# Patient Record
Sex: MALE | Race: OTHER | RURAL
[De-identification: names, ages, dates, MRNs, and addresses within clinical notes are randomized per-mention and may not be internally consistent; named-entity substitution may affect disease eponyms.]

---

## 2020-07-02 NOTE — PATIENT DISCUSSION
GLAUCOMA:  I have talked with the patient about my impressions, explained our treatment plan, and have answered all questions and patient understands. Continue present eye drops.  Patient to follow up 6 months DIL/OCT

## 2023-04-20 ENCOUNTER — NEW PATIENT (OUTPATIENT)
Dept: RURAL CLINIC 3 | Facility: CLINIC | Age: 63
End: 2023-04-20

## 2023-04-20 DIAGNOSIS — H52.223: ICD-10-CM

## 2023-04-20 DIAGNOSIS — H52.03: ICD-10-CM

## 2023-04-20 DIAGNOSIS — H52.4: ICD-10-CM

## 2023-04-20 PROCEDURE — S0620 ROUTINE OPHTHALMOLOGICAL EXA: HCPCS

## 2023-04-20 ASSESSMENT — VISUAL ACUITY
OD_CC: 20/25-1
OS_CC: 20/20

## 2023-04-20 ASSESSMENT — TONOMETRY
OS_IOP_MMHG: 13
OD_IOP_MMHG: 12

## 2024-04-24 ENCOUNTER — ESTABLISHED PATIENT (OUTPATIENT)
Dept: RURAL CLINIC 3 | Facility: CLINIC | Age: 64
End: 2024-04-24

## 2024-04-24 DIAGNOSIS — E11.9: ICD-10-CM

## 2024-04-24 DIAGNOSIS — H21.541: ICD-10-CM

## 2024-04-24 DIAGNOSIS — H25.13: ICD-10-CM

## 2024-04-24 PROCEDURE — 92250 FUNDUS PHOTOGRAPHY W/I&R: CPT

## 2024-04-24 PROCEDURE — 99214 OFFICE O/P EST MOD 30 MIN: CPT

## 2024-04-24 ASSESSMENT — VISUAL ACUITY
OU_CC: 20/20-1
OD_CC: 20/20-1
OS_CC: 20/20-2

## 2024-04-24 ASSESSMENT — TONOMETRY
OD_IOP_MMHG: 13
OS_IOP_MMHG: 13

## 2024-12-10 ENCOUNTER — EMERGENCY VISIT (OUTPATIENT)
Age: 64
End: 2024-12-10

## 2024-12-10 DIAGNOSIS — H25.13: ICD-10-CM

## 2024-12-10 DIAGNOSIS — E11.9: ICD-10-CM

## 2024-12-10 PROCEDURE — 92250 FUNDUS PHOTOGRAPHY W/I&R: CPT

## 2024-12-10 PROCEDURE — 99214 OFFICE O/P EST MOD 30 MIN: CPT

## 2025-02-03 ENCOUNTER — CONSULTATION/EVALUATION (OUTPATIENT)
Age: 65
End: 2025-02-03

## 2025-02-03 DIAGNOSIS — E11.9: ICD-10-CM

## 2025-02-03 DIAGNOSIS — H25.813: ICD-10-CM

## 2025-02-03 PROCEDURE — 99214 OFFICE O/P EST MOD 30 MIN: CPT

## 2025-02-03 PROCEDURE — 92136 OPHTHALMIC BIOMETRY: CPT

## 2025-02-03 PROCEDURE — 92025 CPTRIZED CORNEAL TOPOGRAPHY: CPT | Mod: NC

## 2025-02-03 PROCEDURE — 92134 CPTRZ OPH DX IMG PST SGM RTA: CPT | Mod: NC

## 2025-02-21 ENCOUNTER — PRE-OP/H&P (OUTPATIENT)
Age: 65
End: 2025-02-21

## 2025-02-21 VITALS
DIASTOLIC BLOOD PRESSURE: 78 MMHG | HEART RATE: 90 BPM | BODY MASS INDEX: 26.47 KG/M2 | HEIGHT: 70.5 IN | WEIGHT: 187 LBS | SYSTOLIC BLOOD PRESSURE: 152 MMHG

## 2025-02-21 DIAGNOSIS — E11.9: ICD-10-CM

## 2025-02-21 DIAGNOSIS — K21.9: ICD-10-CM

## 2025-02-21 DIAGNOSIS — E78.2: ICD-10-CM

## 2025-02-21 DIAGNOSIS — I10: ICD-10-CM

## 2025-02-21 DIAGNOSIS — N40: ICD-10-CM

## 2025-02-21 DIAGNOSIS — Z01.818: ICD-10-CM

## 2025-02-21 PROCEDURE — 99213 OFFICE O/P EST LOW 20 MIN: CPT

## 2025-03-21 ENCOUNTER — POST OP/EVAL OF SECOND EYE (OUTPATIENT)
Age: 65
End: 2025-03-21

## 2025-03-21 ENCOUNTER — SURGERY/PROCEDURE (OUTPATIENT)
Age: 65
End: 2025-03-21

## 2025-03-21 DIAGNOSIS — H25.812: ICD-10-CM

## 2025-03-21 DIAGNOSIS — Z98.42: ICD-10-CM

## 2025-03-21 PROCEDURE — 66984 XCAPSL CTRC RMVL W/O ECP: CPT

## 2025-03-21 PROCEDURE — 99024 POSTOP FOLLOW-UP VISIT: CPT

## 2025-05-21 ENCOUNTER — PRE-OP/H&P (OUTPATIENT)
Age: 65
End: 2025-05-21

## 2025-05-21 VITALS
SYSTOLIC BLOOD PRESSURE: 132 MMHG | HEART RATE: 100 BPM | HEIGHT: 70.5 IN | DIASTOLIC BLOOD PRESSURE: 78 MMHG | WEIGHT: 187 LBS | BODY MASS INDEX: 26.47 KG/M2

## 2025-05-21 DIAGNOSIS — K21.9: ICD-10-CM

## 2025-05-21 DIAGNOSIS — E11.9: ICD-10-CM

## 2025-05-21 DIAGNOSIS — E78.2: ICD-10-CM

## 2025-05-21 DIAGNOSIS — N40: ICD-10-CM

## 2025-05-21 DIAGNOSIS — I10: ICD-10-CM

## 2025-05-21 DIAGNOSIS — Z01.818: ICD-10-CM

## 2025-05-21 PROCEDURE — 99213 OFFICE O/P EST LOW 20 MIN: CPT

## 2025-06-05 ENCOUNTER — POST-OP (OUTPATIENT)
Age: 65
End: 2025-06-05

## 2025-06-05 ENCOUNTER — SURGERY/PROCEDURE (OUTPATIENT)
Age: 65
End: 2025-06-05

## 2025-06-05 DIAGNOSIS — Z98.42: ICD-10-CM

## 2025-06-05 DIAGNOSIS — H25.811: ICD-10-CM

## 2025-06-05 DIAGNOSIS — Z98.41: ICD-10-CM

## 2025-06-05 PROCEDURE — 66984 XCAPSL CTRC RMVL W/O ECP: CPT | Mod: 79,RT

## 2025-06-05 PROCEDURE — 99024 POSTOP FOLLOW-UP VISIT: CPT

## 2025-06-11 ENCOUNTER — POST-OP (OUTPATIENT)
Age: 65
End: 2025-06-11

## 2025-06-11 DIAGNOSIS — Z98.41: ICD-10-CM

## 2025-06-11 DIAGNOSIS — Z98.42: ICD-10-CM

## 2025-06-11 PROCEDURE — 99024 POSTOP FOLLOW-UP VISIT: CPT
